# Patient Record
Sex: FEMALE
[De-identification: names, ages, dates, MRNs, and addresses within clinical notes are randomized per-mention and may not be internally consistent; named-entity substitution may affect disease eponyms.]

---

## 2024-04-01 PROBLEM — Z00.00 ENCOUNTER FOR PREVENTIVE HEALTH EXAMINATION: Status: ACTIVE | Noted: 2024-04-01

## 2024-04-03 ENCOUNTER — APPOINTMENT (OUTPATIENT)
Dept: SURGERY | Facility: CLINIC | Age: 39
End: 2024-04-03
Payer: MEDICAID

## 2024-04-03 VITALS
TEMPERATURE: 97.1 F | HEART RATE: 78 BPM | DIASTOLIC BLOOD PRESSURE: 74 MMHG | OXYGEN SATURATION: 99 % | WEIGHT: 111 LBS | HEIGHT: 62 IN | SYSTOLIC BLOOD PRESSURE: 110 MMHG | BODY MASS INDEX: 20.43 KG/M2

## 2024-04-03 DIAGNOSIS — K64.4 RESIDUAL HEMORRHOIDAL SKIN TAGS: ICD-10-CM

## 2024-04-03 PROCEDURE — 99203 OFFICE O/P NEW LOW 30 MIN: CPT | Mod: 25

## 2024-04-03 PROCEDURE — 46600 DIAGNOSTIC ANOSCOPY SPX: CPT

## 2024-04-23 PROBLEM — K64.4 EXTERNAL HEMORRHOIDS: Status: ACTIVE | Noted: 2024-04-06

## 2024-04-23 NOTE — PHYSICAL EXAM
[FreeTextEntry1] : General: Awake, Alert, No Acute Distress Respiratory: Normal Respiratory Effort Rectal: External examination shows large external hemorrhoid tissue with skin tags.

## 2024-04-23 NOTE — ASSESSMENT
[FreeTextEntry1] : 38-year-old female with large external hemorrhoids.   I spoke to the patient regarding her condition. We discussed that since she is newly postpartum, her hemorrhoid symptoms are likely to improve. At this time, I recommend conservative management, including high-fiber diet, fiber supplementation, increased water intake, and topical hydrocortisone cream. She will return in six months for reevaluation.

## 2024-04-23 NOTE — HISTORY OF PRESENT ILLNESS
[FreeTextEntry1] : Patient is a 38-year-old female with no PMHx, PSHx of , cholecystectomy, who's currently two weeks postpartum and presents for evaluation of painful swollen hemorrhoids. Patient reports long-standing hemorrhoid symptoms, which had become worse with her most recent pregnancy. She reports swelling in the perianal area and presents for treatment. She reports irregular bowel movements with alternating constipation and diarrhea. She's currently on prenatal vitamins. She denies recent fevers, chills, nausea, or vomiting. She denies a family history of colon cancer, rectal cancer, or inflammatory bowel disease. Patient has not previously had a colonoscopy.

## 2024-04-23 NOTE — PROCEDURE
[FreeTextEntry1] : Digita rectal exam and anoscopy shows normal sphincter tone, normal internal hemorrhoids, and no masses.

## 2024-04-23 NOTE — ADDENDUM
[FreeTextEntry1] : I, Sharri Alberts ([facility]) assisted in filling out this chart under the dictation of Dr. Vaughn Sanchez on 04/04/2024.

## 2024-10-09 ENCOUNTER — APPOINTMENT (OUTPATIENT)
Dept: SURGERY | Facility: CLINIC | Age: 39
End: 2024-10-09